# Patient Record
Sex: FEMALE | Race: WHITE | URBAN - METROPOLITAN AREA
[De-identification: names, ages, dates, MRNs, and addresses within clinical notes are randomized per-mention and may not be internally consistent; named-entity substitution may affect disease eponyms.]

---

## 2019-05-15 ENCOUNTER — TELEPHONE (OUTPATIENT)
Dept: TRANSPLANT | Facility: CLINIC | Age: 49
End: 2019-05-15

## 2019-05-15 NOTE — TELEPHONE ENCOUNTER
"[facility] BREEZE  [id number]      LIVING KIDNEY DONOR EVALUATION  Donor First Name Lizzy Donor MRN    Donor Last Name Books Completed 2019 8:46 PM    3/9/1971 Record ID [id number]   BREEZE Screen PASSED     Intended Recipient  Recipient First Name ALTRUISTIC Recipient MRN    Recipient Last Name ALTRUISTIC Relationship N/A   Recipient   Recipient Diagnosis    Recipient's ABO      Donor Information  Age 48 Gender Female   Ht 160 cm (5' 3'') Race    Wt 81.6 kg (180 lbs) Ethnicity Not /   BMI 31.90 kg/m  Preferred Language English      Required Yes     Blood Type B   Demographics  Home Address [address] [facility] # [phone]   [facility] Type [facility] Alternate #    [facility] Code [id number] Type    Country [place] Preferred Contact day Mon   Email [email or web address] Preferred Contact time 11:00 AM-1:00 PM   &&   Donor's Medical Information  Medical History Anxiety d/o NOS   GERD   Herpes Genitalis   History of pregnancy Medications Bifidophilus riya force   Valacyclovir   Surgical History Cardiac Surgery, NOS   Tubal Ligation Allergies LATEX : Rash   Social History EtOH: Rare (1-2 drinks/year)   Illicit Drug Use: Denies   Tobacco: Remote; Quit ; (1 ppd x 13 years) Self-Reported Functional Status \"I am able to participate in moderate recreational activities like golf, double tennis, dancing, throwing a baseball or football\"   Family Medical History Cancer (Father, Mother)   Diabetes (denies)   Heart Disease (Grandparent)   Hypertension (denies)   Kidney Disease (denies)   Kidney Stones (denies) Exercise Frequency Exercise (2 X per week)   Review of Organ Systems  Review of Systems Airway or Lungs: No   Blood Disorder: No   Cancer: No   Diabetes,Thyroid,Adrenal,Endocrine Disorder: No   Digestive or Liver: Yes   Female Health: Yes   Heart or Circulatory System: No   Immune Diseases: No   Kidneys and Bladder: No "   Muscles,Bones,Joints: No   Neuro: No   Psych: Yes   &&   Donor's Social Information  Marital Status Domestic Partnership Living Accommodation Lives in rented accommodation   Level of 's or technical degree complete Living Arrangement With spouse   Employment Status Full Time Concerns: health and life insurance No   Employer [employer] Concerns: job security and lost income Yes   Occupation      Medical Insurance Status No medical insurance     High Risk Behavior  High Risk Behaviors Blood transfusion < 12 months. (NO)   Commercial sex < 12 months. (NO)   Illicit IV drug use < 5yrs. (NO)   Other high risk sexual contact < 12 months. (NO)   Reason for Donation  Referral Self Researched Reason for Donation I have 3 healthy kidneys. And i would like to be able to help save and help someone else that otherwise might pass away. I feel this is my contribution to help someone less fortune then myself.   Permission to Disclose Inquiry Yes Patient Comments Being i have 3 kidneys i feel it is a waste if i do not donate one to help save another human life.   Donor Motivation Level Highly motivated donor     PCP Contact  PCP Name    PCP [facility]    PCP State    PCP Phone    Emergency Contact  First Name Hadley First Name Lashawn   Last Name Bossman Last Name Odin   Phone # [phone] Phone # [phone]   Phone Type Mobile Phone Type Home   Relationship Friend or Other Relationship Friend or Other   Office Use  Reviewed By    Reviewed 5/15/2019 12:10 PM   Admin Folder Archive   Comments    Lost for Followup    Extended Comments    BREEZE ID [id number] survey status completed   Activity History  Call  Task    Due Date 5/15/2019   Last Modified Date/Time 5/15/2019 10:59 AM   Comments    Call  Task    Due Date 5/15/2019   Last Modified Date/Time 5/15/2019 10:07 AM   Comments

## 2019-05-16 NOTE — TELEPHONE ENCOUNTER
Wants to be a NDD. States she has 3 kidneys. Luck she had them when a scan was done for a prev surgery.States 2 kidneys are fused together. When explained she couldn't be a donor she understood.